# Patient Record
Sex: MALE | Race: WHITE | Employment: FULL TIME | URBAN - METROPOLITAN AREA
[De-identification: names, ages, dates, MRNs, and addresses within clinical notes are randomized per-mention and may not be internally consistent; named-entity substitution may affect disease eponyms.]

---

## 2017-08-28 ENCOUNTER — HOSPITAL ENCOUNTER (EMERGENCY)
Age: 57
Discharge: HOME | End: 2017-08-28
Attending: EMERGENCY MEDICINE
Payer: COMMERCIAL

## 2017-08-28 VITALS
DIASTOLIC BLOOD PRESSURE: 88 MMHG | RESPIRATION RATE: 16 BRPM | BODY MASS INDEX: 24.44 KG/M2 | SYSTOLIC BLOOD PRESSURE: 165 MMHG | HEART RATE: 88 BPM | TEMPERATURE: 98.3 F | HEIGHT: 69 IN | OXYGEN SATURATION: 98 % | WEIGHT: 165 LBS

## 2017-08-28 DIAGNOSIS — R07.89 ATYPICAL CHEST PAIN: Primary | ICD-10-CM

## 2017-08-28 LAB — TROPONIN I BLD-MCNC: <0.04 NG/ML (ref 0–0.08)

## 2017-08-28 PROCEDURE — 93005 ELECTROCARDIOGRAM TRACING: CPT

## 2017-08-28 PROCEDURE — 99284 EMERGENCY DEPT VISIT MOD MDM: CPT

## 2017-08-28 PROCEDURE — 84484 ASSAY OF TROPONIN QUANT: CPT

## 2017-08-28 NOTE — ED NOTES
The patient was discharged home by Dr. Jose Ramon Valdez in stable condition. The patient is alert and oriented, in no respiratory distress and discharge vital signs obtained. The patient's diagnosis, condition and treatment were explained. The patient expressed understanding. No prescriptions given. No work/school note given. A discharge plan has been developed. A  was not involved in the process. Aftercare instructions were given. Pt ambulatory out of the ED.

## 2017-08-28 NOTE — ED PROVIDER NOTES
HPI Comments: 39-year-old white male presents to the emergency department with chest pains. Patient has been having intermittent chest pains throughout the day. Patient states the pain lasts for a couple of seconds and then goes away. The pain has been intermittent happening about once an hour. Pain is not worse with exertion, or position. pain Is not there currently. Patient was seen at [facility] emergency department earlier today. He reports he had 2 negative troponins are normal ultrasound of his heart. He also had a normal chest x-ray. On his way home he had another episode of this chest pain that lasted about a second. The pain is now gone. Patient reports he had a negative stress test by cardiology at [facility] about 3 years ago. Patient denies leg swelling. No long car trips or plane rides. No abdominal pain. No vomiting or diarrhea. No alcohol or tobacco use. The history is provided by the patient. Past Medical History:   Diagnosis Date    Hypercholesterolemia 8/13/2015    Hypertension        Past Surgical History:   Procedure Laterality Date    HX OTHER SURGICAL      eye surgery from age 2 to 7 -multiple surgeries    HX TONSILLECTOMY      NEUROLOGICAL PROCEDURE UNLISTED  2004    evacuation of subdural hematoma with titanium clip         Family History:   Problem Relation Age of Onset    Other Father      aortic aneurysm    Asthma Brother     Anesth Problems Neg Hx        Social History     Social History    Marital status:      Spouse name: N/A    Number of children: N/A    Years of education: N/A     Occupational History    Not on file. Social History Main Topics    Smoking status: Never Smoker    Smokeless tobacco: Never Used    Alcohol use Yes      Comment: rarely    Drug use: No    Sexual activity: Not on file     Other Topics Concern    Not on file     Social History Narrative         ALLERGIES: Review of patient's allergies indicates no known allergies.     Review of Systems   Constitutional: Negative for activity change and fever. HENT: Negative for congestion. Eyes: Negative for pain. Respiratory: Negative for cough and shortness of breath. Cardiovascular: Positive for chest pain. Negative for leg swelling. Gastrointestinal: Negative for abdominal pain, nausea and vomiting. Endocrine: Negative for polyuria. Genitourinary: Negative for flank pain and hematuria. Musculoskeletal: Negative for gait problem, neck pain and neck stiffness. Skin: Negative for color change. Allergic/Immunologic: Negative for immunocompromised state. Neurological: Negative for speech difficulty and headaches. Hematological: Does not bruise/bleed easily. Psychiatric/Behavioral: Negative for confusion. All other systems reviewed and are negative. There were no vitals filed for this visit. Physical Exam   Constitutional: He is oriented to person, place, and time. He appears well-developed and well-nourished. No distress. HENT:   Head: Normocephalic and atraumatic. Right Ear: External ear normal.   Left Ear: External ear normal.   Nose: Nose normal.   Mouth/Throat: Oropharynx is clear and moist. No oropharyngeal exudate. Eyes: EOM are normal. Pupils are equal, round, and reactive to light. Neck: Normal range of motion. Neck supple. No JVD present. No tracheal deviation present. Cardiovascular: Normal rate, regular rhythm and normal heart sounds. Exam reveals no gallop and no friction rub. No murmur heard. Pulmonary/Chest: Effort normal and breath sounds normal. No stridor. No respiratory distress. He has no wheezes. He has no rales. Abdominal: Soft. Bowel sounds are normal. He exhibits no distension. There is no tenderness. There is no rebound and no guarding. Musculoskeletal: Normal range of motion. He exhibits no edema, tenderness or deformity. Neurological: He is alert and oriented to person, place, and time. He has normal reflexes.  No cranial nerve deficit. He exhibits normal muscle tone. Coordination normal.   Skin: Skin is warm and dry. No rash noted. He is not diaphoretic. No erythema. Psychiatric: He has a normal mood and affect. His behavior is normal. Judgment and thought content normal.   Nursing note and vitals reviewed. MDM  Number of Diagnoses or Management Options  Diagnosis management comments: I have reviewed patient's records from [facility] earlier today. Troponins are negative x2. Chest x-ray was normal. CBC and BMP were normal. No reason to repeat these. We'll check a third time. If this is negative, will reassure her and give followup with cardiology closely. Patient saw Dr. Theron Leger from cardiology in 2013 and had a normal stress echo. Amount and/or Complexity of Data Reviewed  Clinical lab tests: reviewed and ordered  Tests in the radiology section of CPT®: reviewed  Tests in the medicine section of CPT®: reviewed  Decide to obtain previous medical records or to obtain history from someone other than the patient: yes  Obtain history from someone other than the patient: yes  Review and summarize past medical records: yes  Independent visualization of images, tracings, or specimens: yes    Risk of Complications, Morbidity, and/or Mortality  Presenting problems: moderate  Diagnostic procedures: moderate  Management options: moderate    Patient Progress  Patient progress: improved    ED Course       Procedures    EKG: NSR, HR 83, normal axis ,normal intervals, no ST elevations or depressions  Jose Quintanilla MD     6:17 PM  Pt had 2 negative Troponin tests earlier today. 3rd Trop here was negative in the ED. Pt is asymptomatic at this time    Will give cardiology follow up. Pt agrees. Good return precautions given to patient. Close follow up with PCP recommended. Patient and/or family voices understanding of this plan. Discharge instructions were explained by me and all concerns were addressed.

## 2017-08-28 NOTE — ED TRIAGE NOTES
Patient presents ambulatory to treatment area with a steady gait. Patient complains of left chest pain that began about 0500 this morning upon awakening. Denies pain radiation. Pain remains the same with movement or deep breathing. Pain waxes and wanes. Patient denies shortness of breath or palpitations. Diaphoresis has been present throughout the day. Patient states he was seen today at [facility] for chest pain and had EKG and enzyme levels performed between 1000 and 1500; diagnosed with \"lung/asthma trouble\". Pain continues.

## 2017-08-28 NOTE — DISCHARGE INSTRUCTIONS
We hope that we have addressed all of your medical concerns. The examination and treatment you received in the Emergency Department were for an emergent problem and were not intended as complete care. It is important that you follow up with your healthcare provider(s) for ongoing care. If your symptoms worsen or do not improve as expected, and you are unable to reach your usual health care provider(s), you should return to the Emergency Department. Today's healthcare is undergoing tremendous change, and patient satisfaction surveys are one of the many tools to assess the quality of medical care. You may receive a survey from the [employer] regarding your experience in the Emergency Department. I hope that your experience has been completely positive, particularly the medical care that I provided. As such, please participate in the survey; anything less than excellent does not meet my expectations or intentions. [facility] and [address] [place] [address] participate in nationally recognized quality of care measures. If your blood pressure is greater than 120/80, as reported below, we urge that you seek medical care to address the potential of high blood pressure, commonly known as hypertension. Hypertension can be hereditary or can be caused by certain medical conditions, pain, stress, or \"white coat syndrome. \"       Please make an appointment with your health care provider(s) for follow up of your Emergency Department visit. VITALS:   Patient Vitals for the past 8 hrs:   Temp Pulse Resp BP SpO2   08/28/17 1801 98.3 °F (36.8 °C) 88 16 165/88 98 %          Thank you for allowing us to provide you with medical care today. We realize that you have many choices for your emergency care needs. Please choose us in the future for any continued health care needs.       Nallely Bonilla MD    [facility]   Office: [phone]            Recent Results (from the past 24 hour(s))   EKG, 12 LEAD, INITIAL    Collection Time: 08/28/17  5:55 PM   Result Value Ref Range    Ventricular Rate 83 BPM    Atrial Rate 83 BPM    P-R Interval 160 ms    QRS Duration 90 ms    Q-T Interval 374 ms    QTC Calculation (Bezet) 439 ms    Calculated P Axis 58 degrees    Calculated R Axis 28 degrees    Calculated T Axis 19 degrees    Diagnosis       Normal sinus rhythm  Normal ECG  When compared with ECG of 01-JUN-2015 09:02,  Nonspecific T wave abnormality has replaced inverted T waves in Inferior   leads  Nonspecific T wave abnormality no longer evident in Anterolateral leads         Troponin- <0.04       Chest Pain: Care Instructions  Your Care Instructions  There are many things that can cause chest pain. Some are not serious and will get better on their own in a few days. But some kinds of chest pain need more testing and treatment. Your doctor may have recommended a follow-up visit in the next 8 to 12 hours. If you are not getting better, you may need more tests or treatment. Even though your doctor has released you, you still need to watch for any problems. The doctor carefully checked you, but sometimes problems can develop later. If you have new symptoms or if your symptoms do not get better, get medical care right away. If you have worse or different chest pain or pressure that lasts more than 5 minutes or you passed out (lost consciousness), call 911 or seek other emergency help right away. A medical visit is only one step in your treatment. Even if you feel better, you still need to do what your doctor recommends, such as going to all suggested follow-up appointments and taking medicines exactly as directed. This will help you recover and help prevent future problems. How can you care for yourself at home? · Rest until you feel better. · Take your medicine exactly as prescribed.  Call your doctor if you think you are having a problem with your medicine. · Do not drive after taking a prescription pain medicine. When should you call for help? Call 911 if:  · You passed out (lost consciousness). · You have severe difficulty breathing. · You have symptoms of a heart attack. These may include:  ¨ Chest pain or pressure, or a strange feeling in your chest.  ¨ Sweating. ¨ Shortness of breath. ¨ Nausea or vomiting. ¨ Pain, pressure, or a strange feeling in your back, neck, jaw, or upper belly or in one or both shoulders or arms. ¨ Lightheadedness or sudden weakness. ¨ A fast or irregular heartbeat. After you call 911, the  may tell you to chew 1 adult-strength or 2 to 4 low-dose aspirin. Wait for an ambulance. Do not try to drive yourself. Call your doctor today if:  · You have any trouble breathing. · Your chest pain gets worse. · You are dizzy or lightheaded, or you feel like you may faint. · You are not getting better as expected. · You are having new or different chest pain. Where can you learn more? Go to http://augie-ronald.info/. Enter A120 in the search box to learn more about \"Chest Pain: Care Instructions. \"  Current as of: March 20, 2017  Content Version: 11.3  © 6665-6793 [employer]. Care instructions adapted under license by [employer] (which disclaims liability or warranty for this information). If you have questions about a medical condition or this instruction, always ask your healthcare professional. [place] [address] any warranty or liability for your use of this information.

## 2017-08-29 LAB
ATRIAL RATE: 83 BPM
CALCULATED P AXIS, ECG09: 58 DEGREES
CALCULATED R AXIS, ECG10: 28 DEGREES
CALCULATED T AXIS, ECG11: 19 DEGREES
DIAGNOSIS, 93000: NORMAL
P-R INTERVAL, ECG05: 160 MS
Q-T INTERVAL, ECG07: 374 MS
QRS DURATION, ECG06: 90 MS
QTC CALCULATION (BEZET), ECG08: 439 MS
VENTRICULAR RATE, ECG03: 83 BPM

## 2019-02-02 ENCOUNTER — APPOINTMENT (OUTPATIENT)
Dept: CT IMAGING | Age: 59
DRG: 310 | End: 2019-02-02
Attending: EMERGENCY MEDICINE
Payer: COMMERCIAL

## 2019-02-02 ENCOUNTER — APPOINTMENT (OUTPATIENT)
Dept: GENERAL RADIOLOGY | Age: 59
DRG: 310 | End: 2019-02-02
Attending: EMERGENCY MEDICINE
Payer: COMMERCIAL

## 2019-02-02 ENCOUNTER — HOSPITAL ENCOUNTER (OUTPATIENT)
Age: 59
Setting detail: OBSERVATION
Discharge: HOME | DRG: 310 | End: 2019-02-03
Attending: EMERGENCY MEDICINE | Admitting: INTERNAL MEDICINE
Payer: COMMERCIAL

## 2019-02-02 DIAGNOSIS — I95.9 HYPOTENSION, UNSPECIFIED HYPOTENSION TYPE: ICD-10-CM

## 2019-02-02 DIAGNOSIS — I49.8 JUNCTIONAL RHYTHM: ICD-10-CM

## 2019-02-02 DIAGNOSIS — G45.9 TIA (TRANSIENT ISCHEMIC ATTACK): Primary | ICD-10-CM

## 2019-02-02 PROBLEM — R94.31 T WAVE INVERSION IN EKG: Status: ACTIVE | Noted: 2019-02-02

## 2019-02-02 PROBLEM — R00.1 SYMPTOMATIC BRADYCARDIA: Status: ACTIVE | Noted: 2019-02-02

## 2019-02-02 LAB
ALBUMIN SERPL-MCNC: 3.8 G/DL (ref 3.5–5)
ALBUMIN/GLOB SERPL: 1 {RATIO} (ref 1.1–2.2)
ALP SERPL-CCNC: 55 U/L (ref 45–117)
ALT SERPL-CCNC: 24 U/L (ref 12–78)
AMPHET UR QL SCN: NEGATIVE
ANION GAP SERPL CALC-SCNC: 9 MMOL/L (ref 5–15)
AST SERPL-CCNC: 18 U/L (ref 15–37)
BARBITURATES UR QL SCN: NEGATIVE
BASOPHILS # BLD: 0 K/UL (ref 0–0.1)
BASOPHILS NFR BLD: 0 % (ref 0–1)
BENZODIAZ UR QL: NEGATIVE
BILIRUB SERPL-MCNC: 0.7 MG/DL (ref 0.2–1)
BUN SERPL-MCNC: 19 MG/DL (ref 6–20)
BUN/CREAT SERPL: 17 (ref 12–20)
CALCIUM SERPL-MCNC: 9.3 MG/DL (ref 8.5–10.1)
CANNABINOIDS UR QL SCN: NEGATIVE
CHLORIDE SERPL-SCNC: 102 MMOL/L (ref 97–108)
CO2 SERPL-SCNC: 29 MMOL/L (ref 21–32)
COCAINE UR QL SCN: NEGATIVE
CREAT SERPL-MCNC: 1.14 MG/DL (ref 0.7–1.3)
DIFFERENTIAL METHOD BLD: NORMAL
DRUG SCRN COMMENT,DRGCM: NORMAL
EOSINOPHIL # BLD: 0 K/UL (ref 0–0.4)
EOSINOPHIL NFR BLD: 0 % (ref 0–7)
ERYTHROCYTE [DISTWIDTH] IN BLOOD BY AUTOMATED COUNT: 12.5 % (ref 11.5–14.5)
GLOBULIN SER CALC-MCNC: 3.7 G/DL (ref 2–4)
GLUCOSE BLD STRIP.AUTO-MCNC: 115 MG/DL (ref 65–100)
GLUCOSE BLD STRIP.AUTO-MCNC: 152 MG/DL (ref 65–100)
GLUCOSE SERPL-MCNC: 106 MG/DL (ref 65–100)
HCT VFR BLD AUTO: 44.5 % (ref 36.6–50.3)
HGB BLD-MCNC: 14.1 G/DL (ref 12.1–17)
LYMPHOCYTES # BLD: 3 K/UL
LYMPHOCYTES NFR BLD: 30 % (ref 12–49)
MAGNESIUM SERPL-MCNC: 2.3 MG/DL (ref 1.6–2.4)
MCH RBC QN AUTO: 29.2 PG (ref 26–34)
MCHC RBC AUTO-ENTMCNC: 31.7 G/DL (ref 30–36.5)
MCV RBC AUTO: 92.1 FL (ref 80–99)
METHADONE UR QL: NEGATIVE
MONOCYTES # BLD: 1 K/UL (ref 0–1)
MONOCYTES NFR BLD: 10 % (ref 5–13)
NEUTS SEG # BLD: 5.9 K/UL (ref 1.8–8)
NEUTS SEG NFR BLD: 60 % (ref 32–75)
OPIATES UR QL: NEGATIVE
PCP UR QL: NEGATIVE
PLATELET # BLD AUTO: 337 K/UL (ref 150–400)
PMV BLD AUTO: 10.9 FL (ref 8.9–12.9)
POTASSIUM SERPL-SCNC: 3.7 MMOL/L (ref 3.5–5.1)
PROT SERPL-MCNC: 7.5 G/DL (ref 6.4–8.2)
RBC # BLD AUTO: 4.83 M/UL (ref 4.1–5.7)
SERVICE CMNT-IMP: ABNORMAL
SERVICE CMNT-IMP: ABNORMAL
SODIUM SERPL-SCNC: 140 MMOL/L (ref 136–145)
TROPONIN I BLD-MCNC: <0.04 NG/ML (ref 0–0.08)
WBC # BLD AUTO: 10 K/UL (ref 4.1–11.1)
XXWBCSUS: 0

## 2019-02-02 PROCEDURE — 84484 ASSAY OF TROPONIN QUANT: CPT

## 2019-02-02 PROCEDURE — 85025 COMPLETE CBC W/AUTO DIFF WBC: CPT

## 2019-02-02 PROCEDURE — 65660000000 HC RM CCU STEPDOWN

## 2019-02-02 PROCEDURE — 70450 CT HEAD/BRAIN W/O DYE: CPT

## 2019-02-02 PROCEDURE — 99218 HC RM OBSERVATION: CPT

## 2019-02-02 PROCEDURE — 82962 GLUCOSE BLOOD TEST: CPT

## 2019-02-02 PROCEDURE — 99285 EMERGENCY DEPT VISIT HI MDM: CPT

## 2019-02-02 PROCEDURE — 83735 ASSAY OF MAGNESIUM: CPT

## 2019-02-02 PROCEDURE — 74011250636 HC RX REV CODE- 250/636: Performed by: INTERNAL MEDICINE

## 2019-02-02 PROCEDURE — 36415 COLL VENOUS BLD VENIPUNCTURE: CPT

## 2019-02-02 PROCEDURE — 80053 COMPREHEN METABOLIC PANEL: CPT

## 2019-02-02 PROCEDURE — 71045 X-RAY EXAM CHEST 1 VIEW: CPT

## 2019-02-02 PROCEDURE — 74011250636 HC RX REV CODE- 250/636: Performed by: EMERGENCY MEDICINE

## 2019-02-02 PROCEDURE — 80307 DRUG TEST PRSMV CHEM ANLYZR: CPT

## 2019-02-02 RX ORDER — SODIUM CHLORIDE 0.9 % (FLUSH) 0.9 %
5-40 SYRINGE (ML) INJECTION EVERY 8 HOURS
Status: DISCONTINUED | OUTPATIENT
Start: 2019-02-02 | End: 2019-02-03 | Stop reason: HOSPADM

## 2019-02-02 RX ORDER — ENOXAPARIN SODIUM 100 MG/ML
40 INJECTION SUBCUTANEOUS EVERY 24 HOURS
Status: DISCONTINUED | OUTPATIENT
Start: 2019-02-02 | End: 2019-02-03 | Stop reason: HOSPADM

## 2019-02-02 RX ORDER — ONDANSETRON 2 MG/ML
4 INJECTION INTRAMUSCULAR; INTRAVENOUS
Status: DISCONTINUED | OUTPATIENT
Start: 2019-02-02 | End: 2019-02-03 | Stop reason: HOSPADM

## 2019-02-02 RX ORDER — ACETAMINOPHEN 325 MG/1
650 TABLET ORAL
Status: DISCONTINUED | OUTPATIENT
Start: 2019-02-02 | End: 2019-02-03 | Stop reason: HOSPADM

## 2019-02-02 RX ORDER — SODIUM CHLORIDE 0.9 % (FLUSH) 0.9 %
5-40 SYRINGE (ML) INJECTION AS NEEDED
Status: DISCONTINUED | OUTPATIENT
Start: 2019-02-02 | End: 2019-02-03 | Stop reason: HOSPADM

## 2019-02-02 RX ADMIN — Medication 10 ML: at 22:28

## 2019-02-02 RX ADMIN — SODIUM CHLORIDE 1000 ML: 900 INJECTION, SOLUTION INTRAVENOUS at 09:19

## 2019-02-02 RX ADMIN — Medication 10 ML: at 16:30

## 2019-02-02 NOTE — CONSULTS
Tray Cuevas MD [facility] [address]  Office [phone]  Mobile [phone]    Date of  Admission: 2/2/2019  8:49 AM  PCP- Ted Reyes MD    Adam Nava is a 60 y.o. male admitted for Symptomatic bradycardia. Consult requested by Elaina Jaeger MD    Assessment  · Transient junctional rhythm with hypotension, likely vagal mediated  · Abnormal EKG with T wave inversions - normal troponin, no ischemic sx. Normal stress echo 2013  · Pressured speech, anxiety disorder? Discussion/Recommendations:  Trigger for vagal episode likely high anxiety  Transient right facial numbness, negative noncontrast head CT - doubt TIA  Favor outpatient stress echo  Home anytime cardiac wise    Subjective:  No prior cardiac hx other than chest pain with normal stress echo 2013. Reports high stress job ( for [employer]). Recently sleeping poorly. Flight of ideas noted during my interview with somewhat pressured speech. Woke up this morning, anxious, noted facial numbness without other focal findings. ED evaluation - negative head CT, labs. However he became nauseous, then became bradycardic with transient junctional rhythm and low BP, quickly resolving. Feels fine now. Tele - sinus. Troponin normal. EKG with lateral T wave inversions. Moderately active. Patient denies any exertional chest pain, dyspnea, palpitations, syncope, orthopnea, edema or paroxysmal nocturnal dyspnea. Nonsmoker, rare caffeine or alcohol. Past Medical History:   Diagnosis Date    Hypercholesterolemia 8/13/2015    Hypertension       Past Surgical History:   Procedure Laterality Date    HX OTHER SURGICAL      eye surgery from age 2 to 7 -multiple surgeries    HX TONSILLECTOMY      NEUROLOGICAL PROCEDURE UNLISTED  2004    evacuation of subdural hematoma with titanium clip     No current facility-administered medications on file prior to encounter. Current Outpatient Medications on File Prior to Encounter   Medication Sig Dispense Refill    DIGEST. ENZYME/BIOFLAV/MA-OKEEFE (PLANT GUL-QXQCGFA-EBECXP/HERBS PO) Take 1 Tab by mouth.  omega 3-dha-epa-fish oil (FISH OIL) 100-160-1,000 mg cap Take 1 Tab by mouth.  atorvastatin (LIPITOR) 40 mg tablet Take 1 Tab by mouth daily. 30 Tab 5    hydrochlorothiazide (HYDRODIURIL) 25 mg tablet Take 1 Tab by mouth daily. 30 Tab 0    cholecalciferol (VITAMIN D3) 1,000 unit tablet Take 6,000 Units by mouth daily.  potassium chloride SR (KLOR-CON 10) 10 mEq tablet Take 10 mEq by mouth daily.  albuterol (PROAIR HFA) 90 mcg/actuation inhaler Take 1 Puff by inhalation as needed. Allergies   Allergen Reactions    Aspirin Other (comments)     \"Difficulty breathing\"             Review of Symptoms:  Constitutional: negative for fevers, chills, malaise, anorexia and weight loss  Respiratory: negative for cough, sputum or hemoptysis  Gastrointestinal: negative for dysphagia, odynophagia, GERD or melena  Musculoskeletal:negative  Neurological: negative for dizziness, vertigo, seizures and memory problems  Other systems reviewed and negative except as above. Physical Exam    Visit Vitals  /75 (BP 1 Location: Left arm, BP Patient Position: At rest)   Pulse 75   Temp 97.7 °F (36.5 °C)   Resp 19   Ht 5' 9\" (1.753 m)   Wt 167 lb 8.8 oz (76 kg)   SpO2 97%   BMI 24.74 kg/m²     Visit Vitals  /75 (BP 1 Location: Left arm, BP Patient Position: At rest)   Pulse 75   Temp 97.7 °F (36.5 °C)   Resp 19   Ht 5' 9\" (1.753 m)   Wt 167 lb 8.8 oz (76 kg)   SpO2 97%   BMI 24.74 kg/m²     General Appearance:  Well developed, well nourished,alert and oriented x 3, and individual in no acute distress. Ears/Nose/Mouth/Throat:   Hearing grossly normal.         Neck: Supple. Chest:   Lungs clear to auscultation bilaterally. Cardiovascular:  Regular rate and rhythm, S1, S2 normal, no murmur.    Abdomen:   Soft, non-tender, bowel sounds are active. Extremities: No edema bilaterally. Skin: Warm and dry. Cardiographics    Telemetry: normal sinus rhythm  EKG - see above    Recent Results (from the past 12 hour(s))   GLUCOSE, POC    Collection Time: 02/02/19  8:53 AM   Result Value Ref Range    Glucose (POC) 115 (H) 65 - 100 mg/dL    Performed by Helga Richardson (tech)    POC TROPONIN-I    Collection Time: 02/02/19  9:08 AM   Result Value Ref Range    Troponin-I (POC) <0.04 0.00 - 0.08 ng/mL   CBC WITH AUTOMATED DIFF    Collection Time: 02/02/19  9:10 AM   Result Value Ref Range    WBC 10.0 4.1 - 11.1 K/uL    RBC 4.83 4.10 - 5.70 M/uL    HGB 14.1 12.1 - 17.0 g/dL    HCT 44.5 36.6 - 50.3 %    MCV 92.1 80.0 - 99.0 FL    MCH 29.2 26.0 - 34.0 PG    MCHC 31.7 30.0 - 36.5 g/dL    RDW 12.5 11.5 - 14.5 %    PLATELET 428 125 - 512 K/uL    MPV 10.9 8.9 - 12.9 FL    NEUTROPHILS 60 32 - 75 %    LYMPHOCYTES 30 12 - 49 %    MONOCYTES 10 5 - 13 %    EOSINOPHILS 0 0 - 7 %    BASOPHILS 0 0 - 1 %    ABS. NEUTROPHILS 5.9 1.8 - 8.0 K/UL    ABS. LYMPHOCYTES 3.0 K/UL    ABS. MONOCYTES 1.0 0.0 - 1.0 K/UL    ABS. EOSINOPHILS 0.0 0.0 - 0.4 K/UL    ABS. BASOPHILS 0.0 0.0 - 0.1 K/UL    DF AUTOMATED      XXWBCSUS 0     METABOLIC PANEL, COMPREHENSIVE    Collection Time: 02/02/19  9:10 AM   Result Value Ref Range    Sodium 140 136 - 145 mmol/L    Potassium 3.7 3.5 - 5.1 mmol/L    Chloride 102 97 - 108 mmol/L    CO2 29 21 - 32 mmol/L    Anion gap 9 5 - 15 mmol/L    Glucose 106 (H) 65 - 100 mg/dL    BUN 19 6 - 20 MG/DL    Creatinine 1.14 0.70 - 1.30 MG/DL    BUN/Creatinine ratio 17 12 - 20      GFR est AA >60 >60 ml/min/1.73m2    GFR est non-AA >60 >60 ml/min/1.73m2    Calcium 9.3 8.5 - 10.1 MG/DL    Bilirubin, total 0.7 0.2 - 1.0 MG/DL    ALT (SGPT) 24 12 - 78 U/L    AST (SGOT) 18 15 - 37 U/L    Alk.  phosphatase 55 45 - 117 U/L    Protein, total 7.5 6.4 - 8.2 g/dL    Albumin 3.8 3.5 - 5.0 g/dL    Globulin 3.7 2.0 - 4.0 g/dL    A-G Ratio 1.0 (L) 1.1 - 2.2     MAGNESIUM    Collection Time: 02/02/19  9:10 AM   Result Value Ref Range    Magnesium 2.3 1.6 - 2.4 mg/dL   DRUG SCREEN, URINE    Collection Time: 02/02/19  2:15 PM   Result Value Ref Range    AMPHETAMINES NEGATIVE  NEG      BARBITURATES NEGATIVE  NEG      BENZODIAZEPINES NEGATIVE  NEG      COCAINE NEGATIVE  NEG      METHADONE NEGATIVE  NEG      OPIATES NEGATIVE  NEG      PCP(PHENCYCLIDINE) NEGATIVE  NEG      THC (TH-CANNABINOL) NEGATIVE  NEG      Drug screen comment (NOTE)

## 2019-02-02 NOTE — ED TRIAGE NOTES
Pt presents to ED with c/o numbness in left side of face that resolved. Pt states it lasted about 30 minutes and occurred about 0530 this am. The pt has no focal weakness. Gait is steady, speech is clear. Pt states he is quite anxious.

## 2019-02-02 NOTE — ED NOTES
TRANSFER - OUT REPORT: 
 
Verbal report given to Tamera Hagan RN(name) on Honorio Sanford  being transferred to [facility]) for routine progression of care Report consisted of patients Situation, Background, Assessment and  
Recommendations(SBAR). Information from the following report(s) SBAR, ED Summary, MAR, Recent Results and Cardiac Rhythm NSR was reviewed with the receiving nurse. Lines:  
Peripheral IV 02/02/19 Left Antecubital (Active) Site Assessment Clean, dry, & intact 2/2/2019  9:00 AM  
Phlebitis Assessment 0 2/2/2019  9:00 AM  
Infiltration Assessment 0 2/2/2019  9:00 AM  
Dressing Status Clean, dry, & intact 2/2/2019  9:00 AM  
Dressing Type Tape;Transparent 2/2/2019  9:00 AM  
Hub Color/Line Status Pink;Flushed 2/2/2019  9:00 AM  
Alcohol Cap Used Yes 2/2/2019  9:00 AM  
  
 
Opportunity for questions and clarification was provided. Patient transported with: 
 Monitor

## 2019-02-02 NOTE — ED PROVIDER NOTES
HPI The patient presents for evaluation of an episode of left facial numbness that occurred at 5:30 AM today, and lasted for about 30 minutes. He denies having slurred speech, blurred vision, focal weakness, ataxia, headache or other complaints with this episode. After being placed on the monitor in the emergency department the patient had an episode of bradycardia and hypotension. An EKG performed at that time showed a junctional rhythm and he also has T wave abnormalities in the lateral leads that are more pronounced than on previous EKGs. This episode lasted about 5 minutes and then the patient developed a sinus bradycardia and his blood pressure improved. He denies having chest pain, shortness of breath or other complaints. Past Medical History:  
Diagnosis Date  Hypercholesterolemia 8/13/2015  Hypertension Past Surgical History:  
Procedure Laterality Date  HX OTHER SURGICAL    
 eye surgery from age 2 to 7 -multiple surgeries  HX TONSILLECTOMY  NEUROLOGICAL PROCEDURE UNLISTED  2004  
 evacuation of subdural hematoma with titanium clip Family History:  
Problem Relation Age of Onset  Other Father   
     aortic aneurysm  Asthma Brother  Anesth Problems Neg Hx Social History Socioeconomic History  Marital status:  Spouse name: Not on file  Number of children: Not on file  Years of education: Not on file  Highest education level: Not on file Social Needs  Financial resource strain: Not on file  Food insecurity - worry: Not on file  Food insecurity - inability: Not on file  Transportation needs - medical: Not on file  Transportation needs - non-medical: Not on file Occupational History  Not on file Tobacco Use  Smoking status: Never Smoker  Smokeless tobacco: Never Used Substance and Sexual Activity  Alcohol use: Yes Comment: rarely  Drug use: No  
 Sexual activity: Not on file Other Topics Concern  Not on file Social History Narrative  Not on file ALLERGIES: Patient has no known allergies. Review of Systems Constitutional: Negative for fever. Eyes: Negative for visual disturbance. Respiratory: Negative for cough, shortness of breath and wheezing. Cardiovascular: Negative for chest pain and leg swelling. Gastrointestinal: Negative for abdominal pain, diarrhea, nausea and vomiting. Genitourinary: Negative for dysuria. Musculoskeletal: Negative. Negative for back pain and neck stiffness. Skin: Negative for rash. Neurological: Positive for numbness. Negative for syncope, weakness and headaches. Psychiatric/Behavioral: Negative for confusion. All other systems reviewed and are negative. Vitals:  
 02/02/19 0845 02/02/19 0858 02/02/19 0900 BP: 174/86 90/50 111/66 Pulse: 98 (!) 47 (!) 58 Resp: 16 15 15 Temp: 98.9 °F (37.2 °C) SpO2: 98% 99% 98% Weight: 76 kg (167 lb 8.8 oz) Height: 5' 9\" (1.753 m) Physical Exam  
Constitutional: He is oriented to person, place, and time. He appears well-developed and well-nourished. No distress. Pt became pale and diaphoretic during this episode. HENT:  
Head: Normocephalic. Eyes: EOM are normal. Pupils are equal, round, and reactive to light. Neck: Normal range of motion. Cardiovascular: Normal rate. No murmur heard. Pulmonary/Chest: Effort normal and breath sounds normal.  
Abdominal: Soft. There is no tenderness. Musculoskeletal: Normal range of motion. Neurological: He is alert and oriented to person, place, and time. No cranial nerve deficit. Coordination normal.  
Skin: Skin is warm and dry. Capillary refill takes less than 2 seconds. Psychiatric: He has a normal mood and affect. His behavior is normal.  
  
 
MDM Number of Diagnoses or Management Options Hypotension, unspecified hypotension type:  
Junctional rhythm:  
TIA (transient ischemic attack):  
  
 Amount and/or Complexity of Data Reviewed Clinical lab tests: ordered and reviewed Obtain history from someone other than the patient: yes Review and summarize past medical records: yes Discuss the patient with other providers: yes Independent visualization of images, tracings, or specimens: yes Risk of Complications, Morbidity, and/or Mortality Presenting problems: high Diagnostic procedures: high Management options: high Critical Care Total time providing critical care: 30-74 minutes Procedures ED EKG interpretation: 
Rhythm:junctional rhytm rate 46. St changes in lat leads c/w ischemia. This EKG was interpreted by Nic Gonsalez MD,ED Provider.

## 2019-02-02 NOTE — ED NOTES
Pt c/o feeling more anxious and \" yucky\". Pt's HR dropped into the forties. Dr Hadley Lean in to examine pt. EKG repeated.

## 2019-02-02 NOTE — ED NOTES
Timeout: EMTALA completed. Report to CHANDLER Phelan-EMT of [facility]. Pt is stable for transfer to [facility] room 316. Discharge or Transfer Assessment: Patient A&O x4 and in no distress. Physical re-examination reveals some improvement in pts condition with reassessment of vital signs completed at the time of admission transfer and/or discharge.

## 2019-02-02 NOTE — ED NOTES
Called [facility] charge nurse Sagrario Faulkner RN to start admission process. Primary nurse to call us for report

## 2019-02-02 NOTE — H&P
[facility] [address], [place] [address] 
[phone] Admission History and Physical 
 
 
NAME:  Tiago Perez :   1960 MRN:  [id number] PCP:  Rosalio Rosales MD  
 
Date/Time:  2019 Subjective: CHIEF COMPLAINT: symptomatic bradycardia HISTORY OF PRESENT ILLNESS:    
Mr. Martin Maynard is a 60 y.o. with h/o HTN, HLD who presents with facial numbness. While in the ER he developed symptomatic bradycardia with associated low bp. No prior known episodes. No chest pain or sob. Pt reports having multiple prior stress tests that were normal.  
 
 
Past Medical History:  
Diagnosis Date  Hypercholesterolemia 2015  Hypertension Past Surgical History:  
Procedure Laterality Date  HX OTHER SURGICAL    
 eye surgery from age 2 to 7 -multiple surgeries  HX TONSILLECTOMY  NEUROLOGICAL PROCEDURE UNLISTED    
 evacuation of subdural hematoma with titanium clip Social History Tobacco Use  Smoking status: Never Smoker  Smokeless tobacco: Never Used Substance Use Topics  Alcohol use: Yes Comment: rarely Family History Problem Relation Age of Onset  Other Father   
     aortic aneurysm  Asthma Brother  Anesth Problems Neg Hx Allergies Allergen Reactions  Aspirin Other (comments) \"Difficulty breathing\" Prior to Admission medications Medication Sig Start Date End Date Taking? Authorizing Provider DIGEST. ENZYME/BIOFLAV/GAVINO (PLANT MRX-COEWAKW-KZNHQA/HERBS PO) Take 1 Tab by mouth. Other, MD Samantha  
omega 3-dha-epa-fish oil (FISH OIL) 100-160-1,000 mg cap Take 1 Tab by mouth. Samantha Hogue MD  
atorvastatin (LIPITOR) 40 mg tablet Take 1 Tab by mouth daily. 8/13/15   Rosalio Rosales MD  
hydrochlorothiazide (HYDRODIURIL) 25 mg tablet Take 1 Tab by mouth daily.  7/20/15   Rosalio Rosales MD  
 cholecalciferol (VITAMIN D3) 1,000 unit tablet Take 6,000 Units by mouth daily. Provider, Historical  
potassium chloride SR (KLOR-CON 10) 10 mEq tablet Take 10 mEq by mouth daily. Provider, Historical  
albuterol (PROAIR HFA) 90 mcg/actuation inhaler Take 1 Puff by inhalation as needed. Provider, Historical  
 
 
 
Review of Systems: 
  
 
Gen:  Eyes:  ENT:  CVS:  Pulm:  GI:   
:   
MS:  Skin:  Psych:  Endo:   
Hem:  Renal:   
Neuro:    
 
 
  
Objective: VITALS:   
Vital signs reviewed; most recent are: 
 
Visit Vitals /75 (BP 1 Location: Left arm, BP Patient Position: At rest) Pulse 91 Temp 97.7 °F (36.5 °C) Resp 19 Ht 5' 9\" (1.753 m) Wt 76 kg (167 lb 8.8 oz) SpO2 97% BMI 24.74 kg/m² SpO2 Readings from Last 6 Encounters:  
02/02/19 97% 08/28/17 98% 07/20/15 96% 06/10/15 94% 05/29/15 96% 04/28/15 94% No intake or output data in the 24 hours ending 02/02/19 1403 Exam:  
 
Physical Exam: 
 
Gen:  Well-developed, well-nourished, in no acute distress HEENT:  Pink conjunctivae, PERRL, hearing intact to voice, moist mucous membranes Neck:  Supple, without masses, thyroid non-tender Resp:  No accessory muscle use, clear breath sounds without wheezes rales or rhonchi 
Card:  No murmurs, normal S1, S2 without thrills, bruits or peripheral edema Abd:  Soft, non-tender, non-distended, normoactive bowel sounds are present, no palpable organomegaly Lymph:  No cervical adenopathy Musc:  No cyanosis or clubbing Skin:  No rashes or ulcers, skin turgor is good Neuro:  Cranial nerves 3-12 are grossly intact,  strength is 5/5 bilaterally, dorsi / plantarflexion strength is 5/5 bilaterally, follows commands appropriately Psych:  Alert with good insight. Oriented to person, place, and time Labs: 
 
Recent Labs 02/02/19 
5258 WBC 10.0 HGB 14.1 HCT 44.5  Recent Labs 02/02/19 
5367   
K 3.7  CO2 29  
 * BUN 19  
CREA 1.14  
CA 9.3 MG 2.3 ALB 3.8 SGOT 18 ALT 24 No components found for: [place] No results for input(s): PH, PCO2, PO2, HCO3, FIO2 in the last 72 hours. No results for input(s): INR in the last 72 hours. No lab exists for component: INREXT 
 
  
EKG reviewed:   Sinus rhythm, TWI noted. Assessment/Plan:   
  
Principal Problem: 
  Symptomatic bradycardia: denies previous known history. Order TSH, echo. Monitor on telemetry. Consult cardiology Essential hypertension: stopped taking meds. Mildly low BP when bradycardic episode occurred. Monitor trend Hypercholesterolemia: only takes herbal supplements T wave inversion in EKG: multiple prior stress tests per patient. Assess for WMA via echo Anxiety: not an official diagnosis but pt exhibits multiple symptoms consistent with this diagnosis Facial numbness: symptoms not consistent with neurologic issue and have now resolved Total time spent with patient: 70 Minutes Care Plan discussed with: Patient Discussed:  Care Plan Prophylaxis:  Lovenox Probable Disposition:  Home w/Family 
        
___________________________________________________ Attending Physician: Zoe Merrill MD

## 2019-02-03 ENCOUNTER — APPOINTMENT (OUTPATIENT)
Dept: NON INVASIVE DIAGNOSTICS | Age: 59
DRG: 310 | End: 2019-02-03
Attending: INTERNAL MEDICINE
Payer: COMMERCIAL

## 2019-02-03 VITALS
WEIGHT: 167 LBS | DIASTOLIC BLOOD PRESSURE: 82 MMHG | BODY MASS INDEX: 24.73 KG/M2 | HEART RATE: 68 BPM | HEIGHT: 69 IN | OXYGEN SATURATION: 98 % | SYSTOLIC BLOOD PRESSURE: 132 MMHG | RESPIRATION RATE: 18 BRPM | TEMPERATURE: 98 F

## 2019-02-03 LAB
ECHO AO ASC DIAM: 3.27 CM
ECHO AO ROOT DIAM: 3.7 CM
ECHO LA AREA 4C: 14.6 CM2
ECHO LA MAJOR AXIS: 3.91 CM
ECHO LA TO AORTIC ROOT RATIO: 1.05
ECHO LA VOL 2C: 56.68 ML (ref 18–58)
ECHO LA VOL 4C: 32.24 ML (ref 18–58)
ECHO LA VOL BP: 43.1 ML (ref 18–58)
ECHO LA VOL/BSA BIPLANE: 22.53 ML/M2 (ref 16–28)
ECHO LA VOLUME INDEX A2C: 29.62 ML/M2 (ref 16–28)
ECHO LA VOLUME INDEX A4C: 16.85 ML/M2 (ref 16–28)
ECHO LV E' LATERAL VELOCITY: 11.88 CENTIMETER/SECOND
ECHO LV E' SEPTAL VELOCITY: 6.69 CENTIMETER/SECOND
ECHO LV EDV A2C: 69.8 ML
ECHO LV EDV A4C: 73.9 ML
ECHO LV EDV BP: 72.2 ML (ref 67–155)
ECHO LV EDV INDEX A4C: 38.6 ML/M2
ECHO LV EDV INDEX BP: 37.7 ML/M2
ECHO LV EDV NDEX A2C: 36.5 ML/M2
ECHO LV EJECTION FRACTION A2C: 50 %
ECHO LV EJECTION FRACTION A4C: 59 %
ECHO LV EJECTION FRACTION BIPLANE: 54.6 % (ref 55–100)
ECHO LV ESV A2C: 34.7 ML
ECHO LV ESV A4C: 30.5 ML
ECHO LV ESV BP: 32.8 ML (ref 22–58)
ECHO LV ESV INDEX A2C: 18.1 ML/M2
ECHO LV ESV INDEX A4C: 15.9 ML/M2
ECHO LV ESV INDEX BP: 17.1 ML/M2
ECHO LV INTERNAL DIMENSION DIASTOLIC: 3.98 CM (ref 4.2–5.9)
ECHO LV INTERNAL DIMENSION SYSTOLIC: 2.57 CM
ECHO LV IVSD: 1.2 CM (ref 0.6–1)
ECHO LV MASS 2D: 208.9 G (ref 88–224)
ECHO LV MASS INDEX 2D: 109.2 G/M2 (ref 49–115)
ECHO LV POSTERIOR WALL DIASTOLIC: 1.34 CM (ref 0.6–1)
ECHO LVOT DIAM: 2.24 CM
ECHO MV A VELOCITY: 68.15 CM/S
ECHO MV AREA PHT: 2.8 CM2
ECHO MV E DECELERATION TIME (DT): 272.2 MS
ECHO MV E VELOCITY: 0.71 CM/S
ECHO MV E/A RATIO: 0.01
ECHO MV PRESSURE HALF TIME (PHT): 78.9 MS
ECHO RV INTERNAL DIMENSION: 2.74 CM
ECHO RV TAPSE: 1.87 CM (ref 1.5–2)
TSH SERPL DL<=0.05 MIU/L-ACNC: 2.05 UIU/ML (ref 0.36–3.74)

## 2019-02-03 PROCEDURE — 84443 ASSAY THYROID STIM HORMONE: CPT

## 2019-02-03 PROCEDURE — 99218 HC RM OBSERVATION: CPT

## 2019-02-03 PROCEDURE — 93306 TTE W/DOPPLER COMPLETE: CPT

## 2019-02-03 PROCEDURE — 36415 COLL VENOUS BLD VENIPUNCTURE: CPT

## 2019-02-03 PROCEDURE — 74011250637 HC RX REV CODE- 250/637: Performed by: INTERNAL MEDICINE

## 2019-02-03 RX ORDER — PAROXETINE HYDROCHLORIDE 20 MG/1
20 TABLET, FILM COATED ORAL DAILY
Status: DISCONTINUED | OUTPATIENT
Start: 2019-02-03 | End: 2019-02-03 | Stop reason: HOSPADM

## 2019-02-03 RX ORDER — PAROXETINE HYDROCHLORIDE 20 MG/1
20 TABLET, FILM COATED ORAL DAILY
Qty: 30 TAB | Refills: 0 | Status: SHIPPED | OUTPATIENT
Start: 2019-02-03

## 2019-02-03 RX ADMIN — PAROXETINE HYDROCHLORIDE 20 MG: 20 TABLET, FILM COATED ORAL at 09:42

## 2019-02-03 RX ADMIN — Medication 10 ML: at 06:10

## 2019-02-03 NOTE — PROGRESS NOTES
Problem: Falls - Risk of 
Goal: *Absence of Falls Document Hamp Jeffery Fall Risk and appropriate interventions in the flowsheet. Outcome: Progressing Towards Goal 
Fall Risk Interventions: 
  
 
  
 
Medication Interventions: Patient to call before getting OOB. Leidy Beckham 0700- Bedside and Verbal shift change report given to Itz Cardoza RN (oncoming nurse) by GABRIELA RN  (offgoing nurse). Report included the following information SBAR, Kardex and Recent Results. ..  
 
Kyleigh.Cat- MD @ bedside to see. Leidy Beckham 1093- 2D Echo is being process @ bedside. Leidy Beckham I have reviewed discharge instructions with the patient. The patient verbalized understanding, along with one prescription. 1109-Discharged the pt via WC with family ( SON). Leidy Beckham

## 2019-02-03 NOTE — DISCHARGE INSTRUCTIONS
HOSPITALIST DISCHARGE INSTRUCTIONS  NAME: Rhys Bowman   :  1960   MRN:  [id number]     Date/Time:  2/3/2019 9:17 AM    ADMIT DATE: 2019     DISCHARGE DATE: 2/3/2019     ADMITTING DIAGNOSIS:  Low heart rate    DISCHARGE DIAGNOSIS:  As above    MEDICATIONS:    · It is important that you take the medication exactly as they are prescribed. · Keep your medication in the bottles provided by the pharmacist and keep a list of the medication names, dosages, and times to be taken in your wallet. · Do not take other medications without consulting your doctor. Pain Management: per above medications    What to do at Home:  1. Be sure to follow up with cardiology for a stress echo. 2.  Follow up with a psychiatrist or your primary care within one week to discuss the anxiety medication you were started on    Recommended diet:  Resume previous diet    Recommended activity: Activity as tolerated    If you experience any of the following symptoms then please call your primary care physician or return to the emergency room if you cannot get hold of your doctor:  Fever, chills, nausea, vomiting, diarrhea, change in mentation, falling, bleeding, shortness of breath    Follow Up:  Dr. Marybel Ruiz MD  you are to call and set up an appointment to see them in 1 week. Information obtained by :  I understand that if any problems occur once I am at home I am to contact my physician. I understand and acknowledge receipt of the instructions indicated above.                                                                                                                                            Physician's or R.N.'s Signature                                                                  Date/Time                                                                                                                                              Patient or Representative Signature Date/Time

## 2019-02-03 NOTE — PROGRESS NOTES
[facility] [address] 
[address] [facility], [address] [place] [address] 
[phone] Medical Progress Note NAME: Sarthak Goldman :  1960 MRM:  [id number] Date/Time: 2/3/2019 Subjective: Chief Complaint:  F/u low heart rate No acute events. No cp, sob, abd pain. Discussed anxiety. Pt denies depressive symptoms including SI/HI. He is requesting discharge home. Is interested in starting medication for anxiety now. Will follow up with pcp and psychiatrist. 
  
 
  
Objective:  
 
 
Vitals:  
 
 
  
Last 24hrs VS reviewed since prior progress note. Most recent are: 
 
Visit Vitals /82 (BP 1 Location: Left arm, BP Patient Position: At rest) Pulse 68 Temp 98 °F (36.7 °C) Resp 18 Ht 5' 9\" (1.753 m) Wt 75.3 kg (166 lb) SpO2 97% BMI 24.51 kg/m² SpO2 Readings from Last 6 Encounters:  
19 97% 17 98% 07/20/15 96% 06/10/15 94% 05/29/15 96% 04/28/15 94% Intake/Output Summary (Last 24 hours) at 2/3/2019 9861 Last data filed at 2019 4956 Gross per 24 hour Intake 350 ml Output 300 ml Net 50 ml Exam:  
 
Physical Exam: 
 
Gen:  Well-developed, well-nourished, in no acute distress HEENT:  Pink conjunctivae, PERRL, hearing intact to voice, moist mucous membranes Neck:  Supple, without masses, thyroid non-tender Resp:  No accessory muscle use, clear breath sounds without wheezes rales or rhonchi 
Card:  No murmurs, normal S1, S2 without thrills, bruits or peripheral edema Abd:  Soft, non-tender, non-distended, normoactive bowel sounds are present Musc:  No cyanosis or clubbing Skin:  No rashes or ulcers, skin turgor is good Neuro:  Cranial nerves 3-12 are grossly intact,  strength is 5/5 bilaterally and dorsi / plantarflexion is 5/5 bilaterally, follows commands appropriately Psych:  Good insight, oriented to person, place and time, alert Telemetry reviewed:   normal sinus rhythm Medications Reviewed: (see below) Lab Data Reviewed: (see below) 
 
______________________________________________________________________ Medications:  
 
Current Facility-Administered Medications Medication Dose Route Frequency  PARoxetine (PAXIL) tablet 20 mg  20 mg Oral DAILY  sodium chloride (NS) flush 5-40 mL  5-40 mL IntraVENous Q8H  
 sodium chloride (NS) flush 5-40 mL  5-40 mL IntraVENous PRN  
 acetaminophen (TYLENOL) tablet 650 mg  650 mg Oral Q4H PRN  
 ondansetron (ZOFRAN) injection 4 mg  4 mg IntraVENous Q4H PRN  
 enoxaparin (LOVENOX) injection 40 mg  40 mg SubCUTAneous Q24H Lab Review:  
 
Recent Labs 02/02/19 
6336 WBC 10.0 HGB 14.1 HCT 44.5  Recent Labs 02/02/19 
2885   
K 3.7  CO2 29 * BUN 19  
CREA 1.14  
CA 9.3 MG 2.3 ALB 3.8 SGOT 18 ALT 24 No components found for: [place] Assessment / Plan:  
 
 Symptomatic bradycardia: denies previous known history. No further events on telemetry. Reviewed by cardiology who thinks related to anxiety and vasovagal. Plan for outpt stress test 
  
  Essential hypertension: stopped taking meds. Mildly low BP when bradycardic episode occurred. Monitor trend 
  
  Hypercholesterolemia: only takes herbal supplements 
  
  T wave inversion in EKG: plan for stress echo outpt 
  
 Anxiety: significant and likely contributing to above. Does meet some criteria for possible bipolar disorder. He adamantly denies SI/HI. He is interested in start paxil for anxiety. Encouraged close follow up with pcp and psychiatrist 
  
Facial numbness: symptoms not consistent with neurologic issue and have now resolved. CT head normal 
 
 
 
 
Total time spent with patient: 33 Minutes Care Plan discussed with: Patient, Nursing Staff and Consultant/Specialist 
 
Discussed:  Care Plan Prophylaxis:  Lovenox Disposition:  Home w/Family ___________________________________________________ Attending Physician: Mally Hemphill MD

## 2019-02-03 NOTE — PROGRESS NOTES
Bedside and Verbal shift change report given to TONG Philip (oncoming nurse) by Peggy Salguero (offgoing nurse). Report included the following information SBAR, Kardex, MAR, Recent Results and Cardiac Rhythm NSR.

## 2019-02-03 NOTE — DISCHARGE SUMMARY
Physician Discharge Summary     Patient ID:  Allison Llamas  [id number]  60 y.o.  1960    Admit date: 2/2/2019    Discharge date and time: 2/3/2019    Admission Diagnoses: Symptomatic bradycardia    Discharge Diagnoses:    Principal Problem:    Symptomatic bradycardia (2/2/2019)    Active Problems:    Essential hypertension (7/20/2015)      Hypercholesterolemia (8/13/2015)      T wave inversion in EKG (2/2/2019)           Hospital Course:    Symptomatic bradycardia: denies previous known history. No further events on telemetry. Reviewed by cardiology who thinks related to anxiety and vasovagal. Plan for outpt stress test       Essential hypertension: stopped taking meds. Mildly low BP when bradycardic episode occurred. Monitor trend       Hypercholesterolemia: only takes herbal supplements       T wave inversion in EKG: plan for stress echo outpt      Anxiety: significant and likely contributing to above. Does meet some criteria for possible bipolar disorder. He adamantly denies SI/HI. He is interested in start paxil for anxiety. Encouraged close follow up with pcp and psychiatrist     Facial numbness: symptoms not consistent with neurologic issue and have now resolved. CT head normal           PCP: Philipp Paz MD     Consults: Cardiology    Condition of patient at discharge: improved    Discharge Exam:  Please refer to progress note from today. Disposition: home    Patient Instructions:   Current Discharge Medication List      START taking these medications    Details   PARoxetine (PAXIL) 20 mg tablet Take 1 Tab by mouth daily. Qty: 30 Tab, Refills: 0         CONTINUE these medications which have NOT CHANGED    Details   omega 3-dha-epa-fish oil (FISH OIL) 100-160-1,000 mg cap Take 1 Tab by mouth. atorvastatin (LIPITOR) 40 mg tablet Take 1 Tab by mouth daily.   Qty: 30 Tab, Refills: 5    Associated Diagnoses: Hypercholesterolemia      cholecalciferol (VITAMIN D3) 1,000 unit tablet Take 6,000 Units by mouth daily. albuterol (PROAIR HFA) 90 mcg/actuation inhaler Take 1 Puff by inhalation as needed. STOP taking these medications       DIGEST. ENZYME/BIOFLAV/MA-OKEEFE (PLANT BAU-OKRSYNN-UFDIUD/HERBS PO) Comments:   Reason for Stopping:         hydrochlorothiazide (HYDRODIURIL) 25 mg tablet Comments:   Reason for Stopping:         potassium chloride SR (KLOR-CON 10) 10 mEq tablet Comments:   Reason for Stopping:             Activity: Activity as tolerated  Diet: Regular Diet  Wound Care: None needed    Follow-up with Rudolph Rojas MD in 1 week.   Follow-up tests/labs none    Approximate time spent in patient care on day of discharge: 33 minutes    Signed:  Michelene Goldberg, MD  2/3/2019  9:18 AM

## 2019-02-04 ENCOUNTER — HOSPITAL ENCOUNTER (EMERGENCY)
Age: 59
Discharge: HOME | End: 2019-02-04
Attending: EMERGENCY MEDICINE
Payer: COMMERCIAL

## 2019-02-04 VITALS
RESPIRATION RATE: 16 BRPM | HEIGHT: 69 IN | WEIGHT: 160 LBS | HEART RATE: 74 BPM | BODY MASS INDEX: 23.7 KG/M2 | TEMPERATURE: 99.7 F | SYSTOLIC BLOOD PRESSURE: 152 MMHG | DIASTOLIC BLOOD PRESSURE: 89 MMHG | OXYGEN SATURATION: 97 %

## 2019-02-04 DIAGNOSIS — R53.83 FATIGUE, UNSPECIFIED TYPE: Primary | ICD-10-CM

## 2019-02-04 DIAGNOSIS — R00.2 PALPITATIONS: ICD-10-CM

## 2019-02-04 LAB
ALBUMIN SERPL-MCNC: 3.7 G/DL (ref 3.5–5)
ALBUMIN/GLOB SERPL: 0.9 {RATIO} (ref 1.1–2.2)
ALP SERPL-CCNC: 56 U/L (ref 45–117)
ALT SERPL-CCNC: 23 U/L (ref 12–78)
ANION GAP SERPL CALC-SCNC: 12 MMOL/L (ref 5–15)
APPEARANCE UR: CLEAR
AST SERPL-CCNC: 28 U/L (ref 15–37)
ATRIAL RATE: 102 BPM
ATRIAL RATE: 56 BPM
BACTERIA URNS QL MICRO: NEGATIVE /HPF
BASOPHILS # BLD: 0.1 K/UL (ref 0–0.1)
BASOPHILS NFR BLD: 1 % (ref 0–1)
BILIRUB SERPL-MCNC: 0.8 MG/DL (ref 0.2–1)
BILIRUB UR QL: NEGATIVE
BUN SERPL-MCNC: 18 MG/DL (ref 6–20)
BUN/CREAT SERPL: 16 (ref 12–20)
CALCIUM SERPL-MCNC: 9.5 MG/DL (ref 8.5–10.1)
CALCULATED P AXIS, ECG09: 50 DEGREES
CALCULATED P AXIS, ECG09: 59 DEGREES
CALCULATED R AXIS, ECG10: 21 DEGREES
CALCULATED R AXIS, ECG10: 37 DEGREES
CALCULATED T AXIS, ECG11: -55 DEGREES
CALCULATED T AXIS, ECG11: -57 DEGREES
CHLORIDE SERPL-SCNC: 103 MMOL/L (ref 97–108)
CO2 SERPL-SCNC: 27 MMOL/L (ref 21–32)
COLOR UR: NORMAL
COMMENT, HOLDF: NORMAL
CREAT SERPL-MCNC: 1.11 MG/DL (ref 0.7–1.3)
DIAGNOSIS, 93000: NORMAL
DIAGNOSIS, 93000: NORMAL
DIFFERENTIAL METHOD BLD: NORMAL
EOSINOPHIL # BLD: 0.2 K/UL (ref 0–0.4)
EOSINOPHIL NFR BLD: 2 % (ref 0–7)
EPITH CASTS URNS QL MICRO: NORMAL /LPF
ERYTHROCYTE [DISTWIDTH] IN BLOOD BY AUTOMATED COUNT: 12.3 % (ref 11.5–14.5)
GLOBULIN SER CALC-MCNC: 4 G/DL (ref 2–4)
GLUCOSE SERPL-MCNC: 110 MG/DL (ref 65–100)
GLUCOSE UR STRIP.AUTO-MCNC: NEGATIVE MG/DL
HCT VFR BLD AUTO: 45 % (ref 36.6–50.3)
HGB BLD-MCNC: 14.7 G/DL (ref 12.1–17)
HGB UR QL STRIP: NEGATIVE
HYALINE CASTS URNS QL MICRO: NORMAL /LPF (ref 0–5)
IMM GRANULOCYTES # BLD AUTO: 0 K/UL (ref 0–0.04)
IMM GRANULOCYTES NFR BLD AUTO: 0 % (ref 0–0.5)
KETONES UR QL STRIP.AUTO: NEGATIVE MG/DL
LEUKOCYTE ESTERASE UR QL STRIP.AUTO: NEGATIVE
LYMPHOCYTES # BLD: 3.1 K/UL (ref 0.8–3.5)
LYMPHOCYTES NFR BLD: 33 % (ref 12–49)
MAGNESIUM SERPL-MCNC: 2.3 MG/DL (ref 1.6–2.4)
MCH RBC QN AUTO: 29.6 PG (ref 26–34)
MCHC RBC AUTO-ENTMCNC: 32.7 G/DL (ref 30–36.5)
MCV RBC AUTO: 90.5 FL (ref 80–99)
MONOCYTES # BLD: 0.9 K/UL (ref 0–1)
MONOCYTES NFR BLD: 10 % (ref 5–13)
NEUTS SEG # BLD: 5 K/UL (ref 1.8–8)
NEUTS SEG NFR BLD: 54 % (ref 32–75)
NITRITE UR QL STRIP.AUTO: NEGATIVE
NRBC # BLD: 0 K/UL (ref 0–0.01)
NRBC BLD-RTO: 0 PER 100 WBC
P-R INTERVAL, ECG05: 142 MS
P-R INTERVAL, ECG05: 148 MS
PH UR STRIP: 5.5 [PH] (ref 5–8)
PLATELET # BLD AUTO: 349 K/UL (ref 150–400)
PMV BLD AUTO: 10.7 FL (ref 8.9–12.9)
POTASSIUM SERPL-SCNC: 3.7 MMOL/L (ref 3.5–5.1)
PROT SERPL-MCNC: 7.7 G/DL (ref 6.4–8.2)
PROT UR STRIP-MCNC: NEGATIVE MG/DL
Q-T INTERVAL, ECG07: 308 MS
Q-T INTERVAL, ECG07: 442 MS
QRS DURATION, ECG06: 78 MS
QRS DURATION, ECG06: 84 MS
QTC CALCULATION (BEZET), ECG08: 401 MS
QTC CALCULATION (BEZET), ECG08: 426 MS
RBC # BLD AUTO: 4.97 M/UL (ref 4.1–5.7)
RBC #/AREA URNS HPF: NORMAL /HPF (ref 0–5)
SAMPLES BEING HELD,HOLD: NORMAL
SODIUM SERPL-SCNC: 142 MMOL/L (ref 136–145)
SP GR UR REFRACTOMETRY: 1.02 (ref 1–1.03)
TROPONIN I BLD-MCNC: <0.04 NG/ML (ref 0–0.08)
TROPONIN I BLD-MCNC: <0.04 NG/ML (ref 0–0.08)
UR CULT HOLD, URHOLD: NORMAL
UROBILINOGEN UR QL STRIP.AUTO: 0.2 EU/DL (ref 0.2–1)
VENTRICULAR RATE, ECG03: 102 BPM
VENTRICULAR RATE, ECG03: 56 BPM
WBC # BLD AUTO: 9.2 K/UL (ref 4.1–11.1)
WBC URNS QL MICRO: NORMAL /HPF (ref 0–4)

## 2019-02-04 PROCEDURE — 94762 N-INVAS EAR/PLS OXIMTRY CONT: CPT

## 2019-02-04 PROCEDURE — 93005 ELECTROCARDIOGRAM TRACING: CPT

## 2019-02-04 PROCEDURE — 74011250636 HC RX REV CODE- 250/636: Performed by: EMERGENCY MEDICINE

## 2019-02-04 PROCEDURE — 81001 URINALYSIS AUTO W/SCOPE: CPT

## 2019-02-04 PROCEDURE — 83735 ASSAY OF MAGNESIUM: CPT

## 2019-02-04 PROCEDURE — 99284 EMERGENCY DEPT VISIT MOD MDM: CPT

## 2019-02-04 PROCEDURE — 96360 HYDRATION IV INFUSION INIT: CPT

## 2019-02-04 PROCEDURE — 80053 COMPREHEN METABOLIC PANEL: CPT

## 2019-02-04 PROCEDURE — 84484 ASSAY OF TROPONIN QUANT: CPT

## 2019-02-04 PROCEDURE — 85025 COMPLETE CBC W/AUTO DIFF WBC: CPT

## 2019-02-04 PROCEDURE — 36415 COLL VENOUS BLD VENIPUNCTURE: CPT

## 2019-02-04 RX ORDER — SODIUM CHLORIDE 0.9 % (FLUSH) 0.9 %
5-40 SYRINGE (ML) INJECTION EVERY 8 HOURS
Status: DISCONTINUED | OUTPATIENT
Start: 2019-02-04 | End: 2019-02-04 | Stop reason: HOSPADM

## 2019-02-04 RX ORDER — SODIUM CHLORIDE 0.9 % (FLUSH) 0.9 %
5-40 SYRINGE (ML) INJECTION AS NEEDED
Status: DISCONTINUED | OUTPATIENT
Start: 2019-02-04 | End: 2019-02-04 | Stop reason: HOSPADM

## 2019-02-04 RX ADMIN — SODIUM CHLORIDE 1000 ML: 900 INJECTION, SOLUTION INTRAVENOUS at 05:06

## 2019-02-04 NOTE — ED NOTES
I have reviewed discharge instructions with the patient. The patient verbalized understanding. Pt confirmed understanding of need for follow up with primary care provider. Pt is not in any current distress and shows no evidence of clinical instability. Pt left ambulatory  Pt family/friends not present. Pt left with all personal belongings. Pt states they are  driving. Pt states chief complaint has improved with treatment provided Paperwork given by provider and reviewed with patient, opportunity for questions/clarification given. Patient Vitals for the past 4 hrs: 
 Temp Pulse Resp BP SpO2  
02/04/19 0600  74 16 152/89 97 % 02/04/19 0530  70 16 138/89 93 % 02/04/19 0400  85 15 (!) 141/91 95 % 02/04/19 0348 99.7 °F (37.6 °C) 94 13 (!) 172/101 97 %

## 2019-02-04 NOTE — DISCHARGE INSTRUCTIONS
Patient Education     Follow up with PCP regarding 24 hour urine studies      Fatigue: Care Instructions  Your Care Instructions    Fatigue is a feeling of tiredness, exhaustion, or lack of energy. You may feel fatigue because of too much or not enough activity. It can also come from stress, lack of sleep, boredom, and poor diet. Many medical problems, such as viral infections, can cause fatigue. Emotional problems, especially depression, are often the cause of fatigue. Fatigue is most often a symptom of another problem. Treatment for fatigue depends on the cause. For example, if you have fatigue because you have a certain health problem, treating this problem also treats your fatigue. If depression or anxiety is the cause, treatment may help. Follow-up care is a key part of your treatment and safety. Be sure to make and go to all appointments, and call your doctor if you are having problems. It's also a good idea to know your test results and keep a list of the medicines you take. How can you care for yourself at home? · Get regular exercise. But don't overdo it. Go back and forth between rest and exercise. · Get plenty of rest.  · Eat a healthy diet. Do not skip meals, especially breakfast.  · Reduce your use of caffeine, tobacco, and alcohol. Caffeine is most often found in coffee, tea, cola drinks, and chocolate. · Limit medicines that can cause fatigue. This includes tranquilizers and cold and allergy medicines. When should you call for help? Watch closely for changes in your health, and be sure to contact your doctor if:    · You have new symptoms such as fever or a rash.     · Your fatigue gets worse.     · You have been feeling down, depressed, or hopeless. Or you may have lost interest in things that you usually enjoy.     · You are not getting better as expected. Where can you learn more? Go to http://augie-ronald.info/.   Enter G956 in the search box to learn more about \"Fatigue: Care Instructions. \"  Current as of: September 23, 2018  Content Version: 11.9  © 1855-3496 [employer]. Care instructions adapted under license by [employer] (which disclaims liability or warranty for this information). If you have questions about a medical condition or this instruction, always ask your healthcare professional. [address] any warranty or liability for your use of this information.

## 2019-02-04 NOTE — LETTER
Elba Kinney 
[facility] EMERGENCY DEPT 
[address] [place] [address] 
[phone] Work/School Note Date: 2/4/2019 To Whom It May concern: 
 
Rubin Holland was seen and treated today in the emergency room by the following provider(s): 
Attending Provider: Lima Zarate DO Resident: Mukul Mckee MD. Rubin Holland may return to work on 2/6/2019. Sincerely, Isatu Bishop DO

## 2019-02-04 NOTE — ED TRIAGE NOTES
Patient arrives with complaints of \" something is not right\" and \"I am having episodes of anxiety in my groin\" Patient then explains that he continues to have tingling in his feet and sensations of losing his blood pressure

## 2019-02-04 NOTE — ED PROVIDER NOTES
59 y/o M with hx of HTN, HLD who presents with complaints of diaphoresis and weakness. States that he has had 4 episodes within the last 24 hours; last episode woke him up from his sleep at 2:30 AM. Each episode lasts 2-3 minutes. Endorses anxiety. Denies chest pain/ pressure, SOB, dizziness, abdominal pain, nausea, vomiting, dysuria, hematuria. States that he feels rushes of adrenaline during these episodes. Per chart review, pt was started on Paxil on hospital discharge 2/3/19; pt has not started this. TSH wnl and UDS negative from hospital admission. States that he is only taking supplemental medicines right now; denies other changes in medication/ diet. Past Medical History:  
Diagnosis Date  Hypercholesterolemia 8/13/2015  Hypertension Past Surgical History:  
Procedure Laterality Date  HX OTHER SURGICAL    
 eye surgery from age 2 to 7 -multiple surgeries  HX TONSILLECTOMY  NEUROLOGICAL PROCEDURE UNLISTED  2004  
 evacuation of subdural hematoma with titanium clip Family History:  
Problem Relation Age of Onset  Other Father   
     aortic aneurysm  Asthma Brother  Anesth Problems Neg Hx Social History Socioeconomic History  Marital status:  Spouse name: Not on file  Number of children: Not on file  Years of education: Not on file  Highest education level: Not on file Social Needs  Financial resource strain: Not on file  Food insecurity - worry: Not on file  Food insecurity - inability: Not on file  Transportation needs - medical: Not on file  Transportation needs - non-medical: Not on file Occupational History  Not on file Tobacco Use  Smoking status: Never Smoker  Smokeless tobacco: Never Used Substance and Sexual Activity  Alcohol use: Yes Comment: rarely  Drug use: No  
 Sexual activity: Not on file Other Topics Concern  Not on file Social History Narrative  Not on file ALLERGIES: Aspirin Review of Systems Constitutional: Positive for diaphoresis and fatigue. Negative for activity change, appetite change, chills and fever. HENT: Negative. Eyes: Negative for visual disturbance. Respiratory: Negative for cough, chest tightness and shortness of breath. Cardiovascular: Negative for chest pain and palpitations. Gastrointestinal: Negative for abdominal pain, nausea and vomiting. Genitourinary: Negative for difficulty urinating, dysuria and hematuria. Neurological: Negative for dizziness, light-headedness, numbness and headaches. Psychiatric/Behavioral: Negative for suicidal ideas. The patient is nervous/anxious. Vitals:  
 02/04/19 0348 BP: (!) 172/101 Pulse: 94 Resp: 13 Temp: 99.7 °F (37.6 °C) SpO2: 97% Weight: 72.6 kg (160 lb) Height: 5' 9\" (1.753 m) Physical Exam  
Constitutional: He is oriented to person, place, and time. He appears well-developed and well-nourished. No distress. HENT:  
Head: Normocephalic and atraumatic. Mouth/Throat: Oropharynx is clear and moist.  
Eyes: Conjunctivae are normal. Pupils are equal, round, and reactive to light. No scleral icterus. Cardiovascular: Normal rate and regular rhythm. Pulmonary/Chest: Effort normal and breath sounds normal. No respiratory distress. He has no wheezes. He has no rales. Abdominal: Soft. He exhibits no distension. There is no tenderness. There is no guarding. Musculoskeletal: He exhibits no edema or tenderness. Neurological: He is alert and oriented to person, place, and time. He exhibits normal muscle tone. Coordination normal.  
Skin: Skin is warm and dry. Capillary refill takes less than 2 seconds. He is not diaphoretic. Psychiatric:  
Rapid, pressured speech. Nursing note and vitals reviewed. MDM Number of Diagnoses or Management Options Fatigue, unspecified type:  
Palpitations: Diagnosis management comments: CBC, BMP, Mag, Trop, UA 
EKG stable compared to previous Trop neg x 2 Discharge home. Follow up with PCP Procedures Patient seen and discussed with Dr. Corbin Monroe, Attending Physician Melquiades Smith MD  
Family Medicine Resident

## 2024-01-01 NOTE — PROGRESS NOTES
TRANSFER - IN REPORT: 
 
Verbal report received from [address] (name) on Bhumika Timmons  being received from [facility](unit) for routine progression of care Report consisted of patients Situation, Background, Assessment and  
Recommendations(SBAR). Information from the following report(s) SBAR, Kardex and Recent Results was reviewed with the receiving nurse. Opportunity for questions and clarification was provided. Assessment completed upon patients arrival to unit and care assumed. .. Primary Nurse Iveth Dominguez, RN and Edyta Medellin RN, RN performed a dual skin assessment on this patient No impairment noted Rush score is 23. Gallo Ferguson Urine for Drug screen to lab. Gallo Keishaoka 1810-Received the call from pt room regarding pt is having hot flush, sweating and anxeity as soon as he finished his DIINNER. Gallo Fegruson Upon arrival to  pt room check  BP and HR and Blood Sugar. . Noted on chart. . All are lisa. 64- Family @ the bedside with him. Gallo Ferguson He said may be  he is going through  some sort of panic / anxiety disorder. .. 1930- Bedside and Verbal shift change report given to GABRIELA FORTUNE  (oncoming nurse) by Yenny Fuentes RN (offgoing nurse). Report included the following information SBAR, Kardex and Recent Results. .. Benefits, risks, and possible complications of procedure explained to patient/caregiver who verbalized understanding and gave written consent.